# Patient Record
Sex: FEMALE | URBAN - NONMETROPOLITAN AREA
[De-identification: names, ages, dates, MRNs, and addresses within clinical notes are randomized per-mention and may not be internally consistent; named-entity substitution may affect disease eponyms.]

---

## 2021-10-11 ENCOUNTER — OFFICE VISIT (OUTPATIENT)
Dept: URBAN - NONMETROPOLITAN AREA CLINIC 8 | Facility: CLINIC | Age: 61
End: 2021-10-11
Payer: COMMERCIAL

## 2021-10-11 DIAGNOSIS — H25.13 AGE-RELATED NUCLEAR CATARACT, BILATERAL: ICD-10-CM

## 2021-10-11 DIAGNOSIS — H04.123 DRY EYE SYNDROME OF BILATERAL LACRIMAL GLANDS: ICD-10-CM

## 2021-10-11 PROCEDURE — 92004 COMPRE OPH EXAM NEW PT 1/>: CPT | Performed by: OPTOMETRIST

## 2021-10-11 ASSESSMENT — INTRAOCULAR PRESSURE
OD: 18
OS: 18

## 2021-10-11 ASSESSMENT — KERATOMETRY
OD: 46.25
OS: 46.38

## 2021-10-11 NOTE — IMPRESSION/PLAN
Impression: Recurrent erosion of cornea, left eye: H18.832. Plan: Discuss diagnosis with patient. Patient instructed to start morning with Celluvisc ou, Systane Balance qid then Celluvisc HS. Cont. Lacrilube Ointment OU QHS.

## 2021-11-15 ENCOUNTER — OFFICE VISIT (OUTPATIENT)
Dept: URBAN - METROPOLITAN AREA CLINIC 58 | Facility: CLINIC | Age: 61
End: 2021-11-15
Payer: COMMERCIAL

## 2021-11-15 PROCEDURE — 99203 OFFICE O/P NEW LOW 30 MIN: CPT | Performed by: OPHTHALMOLOGY

## 2021-11-15 RX ORDER — OFLOXACIN 3 MG/ML
0.3 % SOLUTION/ DROPS OPHTHALMIC
Qty: 5 | Refills: 0 | Status: ACTIVE
Start: 2021-11-15

## 2021-11-15 ASSESSMENT — INTRAOCULAR PRESSURE
OD: 13
OS: 16

## 2021-11-15 NOTE — IMPRESSION/PLAN
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 Plan: Discuss diagnosis with patient. Inserted contact lens bandage OS in clinic today (Biofinity -0.25). Start Ofloxacin OS QID. Will continue to monitor. Consider Cornea Specialist consult next visit. Call if worsening.

## 2021-11-17 ENCOUNTER — OFFICE VISIT (OUTPATIENT)
Dept: URBAN - METROPOLITAN AREA CLINIC 58 | Facility: CLINIC | Age: 61
End: 2021-11-17
Payer: COMMERCIAL

## 2021-11-17 PROCEDURE — 99203 OFFICE O/P NEW LOW 30 MIN: CPT | Performed by: OPTOMETRIST

## 2021-11-17 RX ORDER — PREDNISOLONE ACETATE 10 MG/ML
1 % SUSPENSION/ DROPS OPHTHALMIC
Qty: 5 | Refills: 0 | Status: INACTIVE
Start: 2021-11-17 | End: 2022-01-04

## 2021-11-17 ASSESSMENT — INTRAOCULAR PRESSURE: OD: 21

## 2021-11-17 NOTE — IMPRESSION/PLAN
Impression: Recurrent erosion of cornea, left eye: H18.832. Plan: Epithelium migration 99% complete. Removed BCL in clinic and reinserted BCL OS (Acuvue Oasys -0.50). Continue Ofloxacin OS QID and start Prednisolone OS BID. Will continue to monitor. Call if worsening.

## 2021-11-19 ENCOUNTER — OFFICE VISIT (OUTPATIENT)
Dept: URBAN - METROPOLITAN AREA CLINIC 58 | Facility: CLINIC | Age: 61
End: 2021-11-19
Payer: COMMERCIAL

## 2021-11-19 DIAGNOSIS — H18.832 RECURRENT EROSION OF CORNEA, LEFT EYE: Primary | ICD-10-CM

## 2021-11-19 PROCEDURE — 99213 OFFICE O/P EST LOW 20 MIN: CPT | Performed by: OPTOMETRIST

## 2021-11-19 ASSESSMENT — INTRAOCULAR PRESSURE: OD: 21

## 2021-11-19 NOTE — IMPRESSION/PLAN
Impression: Recurrent erosion of cornea, left eye: H18.832. Plan: Epithelium migration 99% complete. Removed BCL in clinic. Continue Ofloxacin OS QID x3-4 days and continue Prednisolone OS BID. Increase Prednisolone to TID  if eye gets worse. Stay off of Systane Ointment and bedtime, it could cause erosion to get worse. Can try [address] at bedtime to help relieve symptoms. Call if worsen.